# Patient Record
Sex: FEMALE | Race: WHITE | NOT HISPANIC OR LATINO | Employment: FULL TIME | URBAN - METROPOLITAN AREA
[De-identification: names, ages, dates, MRNs, and addresses within clinical notes are randomized per-mention and may not be internally consistent; named-entity substitution may affect disease eponyms.]

---

## 2017-08-14 ENCOUNTER — OFFICE VISIT (OUTPATIENT)
Dept: SURGERY | Facility: CLINIC | Age: 62
End: 2017-08-14
Payer: COMMERCIAL

## 2017-08-14 ENCOUNTER — TELEPHONE (OUTPATIENT)
Dept: ENDOSCOPY | Facility: HOSPITAL | Age: 62
End: 2017-08-14

## 2017-08-14 VITALS
BODY MASS INDEX: 30.25 KG/M2 | DIASTOLIC BLOOD PRESSURE: 77 MMHG | HEIGHT: 66 IN | WEIGHT: 188.25 LBS | SYSTOLIC BLOOD PRESSURE: 129 MMHG | HEART RATE: 81 BPM

## 2017-08-14 DIAGNOSIS — K62.89 RECTAL OR ANAL PAIN: ICD-10-CM

## 2017-08-14 DIAGNOSIS — Z12.11 SPECIAL SCREENING FOR MALIGNANT NEOPLASMS, COLON: Primary | ICD-10-CM

## 2017-08-14 DIAGNOSIS — Z86.010 HISTORY OF COLON POLYPS: Primary | ICD-10-CM

## 2017-08-14 PROCEDURE — 3008F BODY MASS INDEX DOCD: CPT | Mod: S$GLB,,, | Performed by: COLON & RECTAL SURGERY

## 2017-08-14 PROCEDURE — 99204 OFFICE O/P NEW MOD 45 MIN: CPT | Mod: 25,S$GLB,, | Performed by: COLON & RECTAL SURGERY

## 2017-08-14 PROCEDURE — 99999 PR PBB SHADOW E&M-EST. PATIENT-LVL III: CPT | Mod: PBBFAC,,, | Performed by: COLON & RECTAL SURGERY

## 2017-08-14 RX ORDER — METFORMIN HYDROCHLORIDE EXTENDED-RELEASE TABLETS 1000 MG/1
TABLET, FILM COATED, EXTENDED RELEASE ORAL
COMMUNITY
Start: 2017-05-10 | End: 2019-05-28 | Stop reason: SDUPTHER

## 2017-08-14 RX ORDER — INSULIN GLARGINE 100 [IU]/ML
INJECTION, SOLUTION SUBCUTANEOUS
COMMUNITY
Start: 2017-07-06 | End: 2019-05-28 | Stop reason: SDUPTHER

## 2017-08-14 RX ORDER — CANAGLIFLOZIN 100 MG/1
TABLET, FILM COATED ORAL
Status: ON HOLD | COMMUNITY
Start: 2017-05-25 | End: 2017-09-14

## 2017-08-14 RX ORDER — METFORMIN HYDROCHLORIDE 500 MG/1
TABLET, EXTENDED RELEASE ORAL
COMMUNITY
Start: 2017-08-10 | End: 2017-08-14

## 2017-08-14 RX ORDER — OXYBUTYNIN CHLORIDE 15 MG/1
TABLET, EXTENDED RELEASE ORAL
COMMUNITY
Start: 2016-12-16 | End: 2021-02-12 | Stop reason: SDUPTHER

## 2017-08-14 RX ORDER — POLYETHYLENE GLYCOL 3350, SODIUM SULFATE ANHYDROUS, SODIUM BICARBONATE, SODIUM CHLORIDE, POTASSIUM CHLORIDE 236; 22.74; 6.74; 5.86; 2.97 G/4L; G/4L; G/4L; G/4L; G/4L
4 POWDER, FOR SOLUTION ORAL ONCE
Qty: 4000 ML | Refills: 0 | Status: SHIPPED | OUTPATIENT
Start: 2017-08-14 | End: 2017-08-14

## 2017-08-14 RX ORDER — ROSUVASTATIN CALCIUM 5 MG/1
5 TABLET, COATED ORAL DAILY
COMMUNITY
End: 2021-02-12 | Stop reason: ALTCHOICE

## 2017-08-14 NOTE — PROGRESS NOTES
Subjective:       Patient ID: Dulce Maria Luna is a 61 y.o. female.    Chief Complaint: Anal Fissure and Rectal Pain    HPI New pt.  With Sjogren's syndrome.  Complains of constipation manifests by difficulty with evacuation requiring positional changes and some manual manipulation.  She has a bowel movement approximately every 4 days, takes Colace, pain is only 1 out of 10. No bleeding.  She had an InterStim placed last year for urinary incontinence.  This has not helped.  She's had 2 episodes of incontinence to liquid stool.  Last colonoscopy 2011.    Review of Systems   Constitutional: Negative for chills and fever.   HENT:        Dry mouth   Eyes:        Dry eyes   Respiratory: Negative for cough and shortness of breath.    Gastrointestinal: Negative for nausea and vomiting.   Genitourinary: Positive for enuresis and urgency.   Neurological: Negative for seizures and numbness.       Objective:      Physical Exam   Constitutional: She is oriented to person, place, and time. She appears well-developed and well-nourished.   Eyes: Conjunctivae and EOM are normal.   Pulmonary/Chest: Effort normal. No respiratory distress.   Genitourinary:   Genitourinary Comments: Anal rectal: Normal perianal skin  MARCUS: Decreased tone with decreased squeeze.  No masses. Relaxation with bear down.  No rectocele. Anoscopy: Normal distal rectal mucosa, internal hemorrhoids without stigmata of prolapse or bleeding.   Musculoskeletal: Normal range of motion. She exhibits no edema.   Neurological: She is alert and oriented to person, place, and time.   Skin: Skin is warm. No erythema.   Psychiatric: She has a normal mood and affect. Her behavior is normal.       Assessment:       1. History of colon polyps    2. Rectal or anal pain        Plan:       Dietary and supplemental fiber.  Colonoscopy.  Consider defecography if no improvement with fiber.

## 2017-09-01 ENCOUNTER — TELEPHONE (OUTPATIENT)
Dept: ENDOSCOPY | Facility: HOSPITAL | Age: 62
End: 2017-09-01

## 2017-09-01 RX ORDER — LISINOPRIL 5 MG/1
5 TABLET ORAL DAILY
COMMUNITY
End: 2022-02-02 | Stop reason: DRUGHIGH

## 2017-09-01 NOTE — TELEPHONE ENCOUNTER
----- Message from Argentina Stapleton RN sent at 9/1/2017 10:26 AM CDT -----  Contact: Pt  Pt wants to reschedule scope.   Thank you,  Argentina     ----- Message -----  From: Komal Augustin  Sent: 9/1/2017   9:46 AM  To: [facility] Staff    Pt needs to reschedule an appt    Pt can be reached at [phone] or [phone]    Thanks

## 2017-09-14 ENCOUNTER — ANESTHESIA EVENT (OUTPATIENT)
Dept: ENDOSCOPY | Facility: HOSPITAL | Age: 62
End: 2017-09-14
Payer: COMMERCIAL

## 2017-09-14 ENCOUNTER — ANESTHESIA (OUTPATIENT)
Dept: ENDOSCOPY | Facility: HOSPITAL | Age: 62
End: 2017-09-14
Payer: COMMERCIAL

## 2017-09-14 ENCOUNTER — SURGERY (OUTPATIENT)
Age: 62
End: 2017-09-14

## 2017-09-14 ENCOUNTER — HOSPITAL ENCOUNTER (OUTPATIENT)
Facility: HOSPITAL | Age: 62
Discharge: HOME | End: 2017-09-14
Attending: COLON & RECTAL SURGERY | Admitting: COLON & RECTAL SURGERY
Payer: COMMERCIAL

## 2017-09-14 VITALS
SYSTOLIC BLOOD PRESSURE: 124 MMHG | BODY MASS INDEX: 29.73 KG/M2 | DIASTOLIC BLOOD PRESSURE: 64 MMHG | HEIGHT: 66 IN | RESPIRATION RATE: 18 BRPM | HEART RATE: 69 BPM | WEIGHT: 185 LBS | OXYGEN SATURATION: 99 % | TEMPERATURE: 98 F

## 2017-09-14 VITALS — RESPIRATION RATE: 12 BRPM

## 2017-09-14 DIAGNOSIS — Z12.11 SCREENING FOR COLON CANCER: ICD-10-CM

## 2017-09-14 LAB
POCT GLUCOSE: 204 MG/DL (ref 70–110)
POCT GLUCOSE: <20 MG/DL (ref 70–110)

## 2017-09-14 PROCEDURE — G0105 COLORECTAL SCRN; HI RISK IND: HCPCS | Mod: ,,, | Performed by: COLON & RECTAL SURGERY

## 2017-09-14 PROCEDURE — D9220A PRA ANESTHESIA: Mod: 33,ANES,, | Performed by: ANESTHESIOLOGY

## 2017-09-14 PROCEDURE — 25000003 PHARM REV CODE 250: Performed by: NURSE PRACTITIONER

## 2017-09-14 PROCEDURE — G0105 COLORECTAL SCRN; HI RISK IND: HCPCS | Performed by: COLON & RECTAL SURGERY

## 2017-09-14 PROCEDURE — 37000009 HC ANESTHESIA EA ADD 15 MINS: Performed by: COLON & RECTAL SURGERY

## 2017-09-14 PROCEDURE — D9220A PRA ANESTHESIA: Mod: 33,CRNA,, | Performed by: NURSE ANESTHETIST, CERTIFIED REGISTERED

## 2017-09-14 PROCEDURE — 63600175 PHARM REV CODE 636 W HCPCS: Performed by: NURSE ANESTHETIST, CERTIFIED REGISTERED

## 2017-09-14 PROCEDURE — 37000008 HC ANESTHESIA 1ST 15 MINUTES: Performed by: COLON & RECTAL SURGERY

## 2017-09-14 RX ORDER — PROPOFOL 10 MG/ML
VIAL (ML) INTRAVENOUS
Status: DISCONTINUED | OUTPATIENT
Start: 2017-09-14 | End: 2017-09-14

## 2017-09-14 RX ORDER — PROPOFOL 10 MG/ML
VIAL (ML) INTRAVENOUS CONTINUOUS PRN
Status: DISCONTINUED | OUTPATIENT
Start: 2017-09-14 | End: 2017-09-14

## 2017-09-14 RX ORDER — LIDOCAINE HCL/PF 100 MG/5ML
SYRINGE (ML) INTRAVENOUS
Status: DISCONTINUED | OUTPATIENT
Start: 2017-09-14 | End: 2017-09-14

## 2017-09-14 RX ORDER — SODIUM CHLORIDE 9 MG/ML
INJECTION, SOLUTION INTRAVENOUS CONTINUOUS
Status: DISCONTINUED | OUTPATIENT
Start: 2017-09-14 | End: 2017-09-14 | Stop reason: HOSPADM

## 2017-09-14 RX ADMIN — PROPOFOL 80 MG: 10 INJECTION, EMULSION INTRAVENOUS at 10:09

## 2017-09-14 RX ADMIN — SODIUM CHLORIDE: 900 INJECTION, SOLUTION INTRAVENOUS at 10:09

## 2017-09-14 RX ADMIN — LIDOCAINE HYDROCHLORIDE 75 MG: 20 INJECTION, SOLUTION INTRAVENOUS at 10:09

## 2017-09-14 RX ADMIN — PROPOFOL 150 MCG/KG/MIN: 10 INJECTION, EMULSION INTRAVENOUS at 10:09

## 2017-09-14 NOTE — ANESTHESIA PREPROCEDURE EVALUATION
09/14/2017  Dulce Maria Luna is a 61 y.o., female here for colonoscopy.    Pre-op Assessment    I have reviewed the Patient Summary Reports.     I have reviewed the Nursing Notes.   I have reviewed the Medications.     Review of Systems  Anesthesia Hx:  No problems with previous Anesthesia  History of prior surgery of interest to airway management or planning: (s/pbladder sling(SPARC) sx/Bladder sling removed/x3 C-sections/colonoscopy-polyps removed/T&A/Lap Band sx/Partial Hystrectomy/Alfredo.Oophrectomy/Urinary sx for injury repair/botox injection/cyto X3) Denies Family Hx of Anesthesia complications.   Denies Personal Hx of Anesthesia complications.   Social:  Tobacco Use: Former smoker for 20 years ( quit 14years ago) years, quit smoking >10 years ago Alcohol Use: Pt consumes occ'l., Denies Alcohol Abuse. alcohol use is current   Hematology/Oncology:         -- Denies Anemia: -- Denies Leukemia:   -- Denies Lymphoma:   -- Coag Disorders: Denies Bleeding Disorder:  Denies Current/Recent Cancer  --  Denies Cancer in past history:    EENT/Dental:   Eyes: Visual Impairment Wears Glasses. Wears reading glasses only.  Denies ear symptoms  Denies Nasal Symptoms.  Denies Jaw Problems   Cardiovascular:  Functional Capacity 4 METS, housework/climbs stairs daily  Denies Deep Venous Thrombosis (DVT)   Denies Peripheral Arterial Disease.   Denies Hypertension.    Pulmonary:   Denies Pneumonia Asthma Shortness of breath  Asthma:  last episode was > 1 year ago.  Denies Obstructive Sleep Apnea (PAM) Possible Obstructive Sleep Apnea , (STOP/BANG) Symptoms S - Snoring (loud)  Pulmonary Infection:  Pneumonia (history). , past history (> 3 months ago).   Renal/:  Renal Symptoms/Infections/Stones: nocturia, incontinence, urgency.  Urinary Tract Infection (UTI) (treated 1 month ago) Hypertonicity of bladder   Hepatic/GI:   GERD   Esophageal / Stomach Disorders Gerd Controlled by chronic antireflux medication.  Denies Liver Disease    Musculoskeletal:  Musculoskeletal General/Symptoms: neck pain. Functional capacity is unlimited.  Denies Joint Disease   Rheumatic Disease, Sjogren Syndrome    Neurological:   no Neuro Symptoms Denies Seizure Disorder  Denies CVA - Cerebrovasular Accident  Denies TIA - Transient Ischemic Attack    Endocrine:   Diabetes, type 2  Diabetes, Type 2 Diabetes for 3 years years , controlled by oral hypoglycemics, insulin. Typical AM glucose range: 120s-130s  Denies Thyroid Disease  Metabolic Disorders, Hyperlipoproteinemia, mixed hyperlipidemia  Psych:   Denies Sleep Disorder. Anxiety Disorder and Chronic Anxiety Disorder.   Denies Depression.   Denies Sleep Disorder.        Physical Exam  General:  Well nourished    Airway/Jaw/Neck:  Airway Findings: Mouth Opening: Small, but > 3cm Tongue: Normal  General Airway Assessment: Adult, Possible difficult intubation  Mallampati: IV  Improves to III with phonation.  TM Distance: Normal, at least 6 cm        Eyes/Ears/Nose:  EYES/EARS/NOSE FINDINGS: Normal   Dental:  DENTAL FINDINGS: Normal   Chest/Lungs:  Chest/Lungs Findings: Clear to auscultation, Normal Respiratory Rate     Heart/Vascular:  Heart Findings: Normal Heart murmur: negative       Mental Status:  Mental Status Findings: Normal        Anesthesia Plan  Type of Anesthesia, risks & benefits discussed:  Anesthesia Type:  general  Patient's Preference: MAC  Intra-op Monitoring Plan: standard ASA monitors  Intra-op Monitoring Plan Comments:   Post Op Pain Control Plan: multimodal analgesia  Post Op Pain Control Plan Comments:   Induction:   IV  Beta Blocker:  Patient is not currently on a Beta-Blocker (No further documentation required).       Informed Consent: Patient understands risks and agrees with Anesthesia plan.  Questions answered. Anesthesia consent signed with patient.  ASA Score: 2     Day of Surgery  Review of History & Physical:    H&P update referred to the surgeon.         Ready For Surgery From Anesthesia Perspective.

## 2017-09-14 NOTE — H&P
Endoscopy H&P    Procedure : Colonoscopy      constipation and personal history of colon polyps      Past Medical History:   Diagnosis Date    Anxiety     Asthma     Bronchitis     Diabetes mellitus     type 2    Disturbed sleep rhythm     due to nocturia    GERD (gastroesophageal reflux disease)     High cholesterol     Hx of colonic polyps     Neck pain     injury hx-bulging x3 disk    Nocturia     Pneumonia     history    Sjogren's disease     Snoring     SOB (shortness of breath) on exertion     mild due to deconditioning    Visual impairment              Review of Systems -ROS:  GENERAL: No fever, chills, fatigability or weight loss.  CHEST: Denies AIKEN, cyanosis, wheezing, cough and sputum production.  CARDIOVASCULAR: Denies chest pain, PND, orthopnea or reduced exercise tolerance.   Musculoskeletal ROS: negative for - gait disturbance or joint pain  Neurological ROS: negative for - confusion or memory loss        Physical Exam:  General: well developed, well nourished, no distress  Head: normocephalic  Neck: supple, symmetrical, trachea midline  Lungs: normal respiratory effort  Heart: regular rate and rhythm  Abdomen: soft, non-tender non-distented; bowel sounds normal; no masses,  no organomegaly  Extremities: no cyanosis or edema, or clubbing       Moderate Sedation (choice): Mallampati Score 2    ASA : II    IMP: constipation and personal history of colon polyps    Plan: Colonoscopy with Moderate sedation.  I have explained the procedure including indications, alternatives, expected outcomes and potential complications. The patient appears to understand and gives informed consent. The patient is medically ready for surgery.

## 2017-09-14 NOTE — PATIENT INSTRUCTIONS
Discharge Summary/Instructions after an Endoscopic Procedure  Patient Name: Dulce Maria Luna  Patient MRN: [id number]  Patient YOB: 1955 Thursday, September 14, 2017  Indra Garcia MD  RESTRICTIONS:  During your procedure today, you received medications for sedation.  These   medications may affect your judgment, balance and coordination.  Therefore,   for 24 hours, you have the following restrictions:   - DO NOT drive a car, operate machinery, make legal/financial decisions,   sign important papers or drink alcohol.    ACTIVITY:  The following day: return to full activity including work, except no heavy   lifting, straining or running for 3 days if polyps were removed.  DIET:  Eat and drink normally unless instructed otherwise.  TREATMENT FOR COMMON SIDE EFFECTS:  - Mild abdominal pain, belching, bloating or excessive gas: rest, eat   lightly and use a heating pad.  - Sore Throat: treat with throat lozenges and/or gargle with warm salt   water.  SYMPTOMS TO WATCH FOR AND REPORT TO YOUR PHYSICIAN:  1. Abdominal pain or bloating, other than gas cramps.  2. Chest pain.  3. Back pain.  4. Chills or fever occurring within 24 hours after the procedure.  5. Rectal bleeding, which would show as bright red, maroon, or black stools.   (A tablespoon of blood from the rectum is not serious, especially if   hemorrhoids are present.)  6. Vomiting.  7. Weakness or dizziness.  8. Because air was used during the procedure, expelling large amounts of air   from your rectum or belching is normal.  9. If a bowel prep was taken, you may not have a bowel movement for 1-3   days.  This is normal.  GO DIRECTLY TO THE EMERGENCY ROOM IF YOU HAVE ANY OF THE FOLLOWING:   Difficulty breathing   Chills and/or fever over 101 F   Persistent vomiting and/or vomiting blood   Severe abdominal pain   Severe chest pain   Black, tarry stools   Bleeding- more than one tablespoon  Your doctor recommends these additional instructions:  If  any biopsies were taken, your doctors clinic will call you in 1 to 2   weeks with any results.  You have a contact number available for emergencies.  The signs and symptoms   of potential delayed complications were discussed with you.  You may return   to normal activities tomorrow.  Written discharge instructions were   provided to you.   You are being discharged to home.   Resume your regular diet indefinitely.   Continue your present medications.   Your physician has recommended a repeat colonoscopy in 10 years for   screening purposes.  For questions, problems or results please call your physician - Indra Garcia MD at Work:  [phone].  [facility], EMERGENCY ROOM PHONE NUMBER: [phone]  IF A COMPLICATION OR EMERGENCY SITUATION ARISES AND YOU ARE UNABLE TO REACH   YOUR PHYSICIAN - GO DIRECTLY TO THE EMERGENCY ROOM.  Indra Garcia MD  9/14/2017 10:47:05 AM  This report has been verified and signed electronically.

## 2017-09-14 NOTE — DISCHARGE INSTRUCTIONS
Colonoscopy     A camera attached to a flexible tube with a viewing lens is used to take video pictures.     Colonoscopy is a test to view the inside of your lower digestive tract (colon and rectum). Sometimes it can show the last part of the small intestine (ileum). During the test, small pieces of tissue may be removed for testing. This is called a biopsy. Small growths, such as polyps, may also be removed.   Why is colonoscopy done?  The test is done to help look for colon cancer. And it can help find the source of abdominal pain, bleeding, and changes in bowel habits. It may be needed once a year, depending on factors such as your:  · Age  · Health history  · Family health history  · Symptoms  · Results from any prior colonoscopy  Risks and possible complications  These include:  · Bleeding               · A puncture or tear in the colon   · Risks of anesthesia  · A cancer lesion not being seen  Getting ready   To prepare for the test:  · Talk with your healthcare provider about the risks of the test (see below). Also ask your healthcare provider about alternatives to the test.  · Tell your healthcare provider about any medicines you take. Also tell him or her about any health conditions you may have.  · Make sure your rectum and colon are empty for the test. Follow the diet and bowel prep instructions exactly. If you dont, the test may need to be rescheduled.  · Plan for a friend or family member to drive you home after the test.     Colonoscopy provides an inside view of the entire colon.     You may discuss the results with your doctor right away or at a future visit.  During the test   The test is usually done in the hospital on an outpatient basis. This means you go home the same day. The procedure takes about 30 minutes. During that time:  · You are given relaxing (sedating) medicine through an IV line. You may be drowsy, or fully asleep.  · The healthcare provider will first give you a physical exam to  check for anal and rectal problems.  · Then the anus is lubricated and the scope inserted.  · If you are awake, you may have a feeling similar to needing to have a bowel movement. You may also feel pressure as air is pumped into the colon. Its OK to pass gas during the procedure.  · Biopsy, polyp removal, or other treatments may be done during the test.  After the test   You may have gas right after the test. It can help to try to pass it to help prevent later bloating. Your healthcare provider may discuss the results with you right away. Or you may need to schedule a follow-up visit to talk about the results. After the test, you can go back to your normal eating and other activities. You may be tired from the sedation and need to rest for a few hours.  Date Last Reviewed: 11/1/2016 © 2000-2017 [employer]. [address] [place] [address], [place] [address]. All rights reserved. This information is not intended as a substitute for professional medical care. Always follow your healthcare professional's instructions.

## 2017-09-14 NOTE — TRANSFER OF CARE
"Anesthesia Transfer of Care Note    Patient: Dulce Maria Luna    Procedure(s) Performed: Procedure(s) (LRB):  COLONOSCOPY (N/A)    Patient location: GI    Anesthesia Type: general    Transport from OR: Transported from OR on room air with adequate spontaneous ventilation    Post pain: adequate analgesia    Post assessment: no apparent anesthetic complications    Post vital signs: stable    Level of consciousness: awake    Nausea/Vomiting: no nausea/vomiting    Complications: none    Transfer of care protocol was followed      Last vitals:   Visit Vitals  /74   Pulse 70   Temp 36.6 °C (97.9 °F) (Oral)   Resp 16   Ht 5' 6" (1.676 m)   Wt 83.9 kg (185 lb)   SpO2 96%   Breastfeeding? No   BMI 29.86 kg/m²     "

## 2017-09-15 NOTE — ANESTHESIA POSTPROCEDURE EVALUATION
"Anesthesia Post Evaluation    Patient: Dulce Maria Luna    Procedure(s) Performed: Procedure(s) (LRB):  COLONOSCOPY (N/A)    Final Anesthesia Type: general  Patient location during evaluation: GI PACU  Patient participation: Yes- Able to Participate  Level of consciousness: awake and alert  Post-procedure vital signs: reviewed and stable  Pain management: adequate  Airway patency: patent  PONV status at discharge: No PONV  Anesthetic complications: no      Cardiovascular status: blood pressure returned to baseline  Respiratory status: spontaneous ventilation  Hydration status: euvolemic  Follow-up not needed.        Visit Vitals  /64   Pulse 69   Temp 36.6 °C (97.9 °F) (Oral)   Resp 18   Ht 5' 6" (1.676 m)   Wt 83.9 kg (185 lb)   SpO2 99%   Breastfeeding? No   BMI 29.86 kg/m²       Pain/Abhijeet Score: Pain Assessment Performed: Yes (9/14/2017 10:54 AM)  Presence of Pain: complains of pain/discomfort (9/14/2017 10:54 AM)  Pain Rating Prior to Med Admin: 3 (9/14/2017 10:54 AM)  Abhijeet Score: 10 (9/14/2017 10:54 AM)      "

## 2017-09-21 ENCOUNTER — TELEPHONE (OUTPATIENT)
Dept: ENDOSCOPY | Facility: HOSPITAL | Age: 62
End: 2017-09-21

## 2019-05-28 ENCOUNTER — OFFICE VISIT (OUTPATIENT)
Dept: SURGERY | Facility: CLINIC | Age: 64
End: 2019-05-28
Payer: MEDICARE

## 2019-05-28 VITALS
SYSTOLIC BLOOD PRESSURE: 137 MMHG | HEIGHT: 66 IN | BODY MASS INDEX: 29.87 KG/M2 | WEIGHT: 185.88 LBS | DIASTOLIC BLOOD PRESSURE: 92 MMHG | HEART RATE: 113 BPM

## 2019-05-28 DIAGNOSIS — K59.02 CONSTIPATION BY OUTLET DYSFUNCTION: Primary | ICD-10-CM

## 2019-05-28 PROCEDURE — 46600 DIAGNOSTIC ANOSCOPY SPX: CPT | Mod: PBBFAC | Performed by: NURSE PRACTITIONER

## 2019-05-28 PROCEDURE — 99999 PR PBB SHADOW E&M-EST. PATIENT-LVL III: ICD-10-PCS | Mod: PBBFAC,,, | Performed by: NURSE PRACTITIONER

## 2019-05-28 PROCEDURE — 46600 DIAGNOSTIC ANOSCOPY SPX: CPT | Mod: S$PBB,,, | Performed by: NURSE PRACTITIONER

## 2019-05-28 PROCEDURE — 99999 PR PBB SHADOW E&M-EST. PATIENT-LVL III: CPT | Mod: PBBFAC,,, | Performed by: NURSE PRACTITIONER

## 2019-05-28 PROCEDURE — 46600 PR DIAG2STIC A2SCOPY: ICD-10-PCS | Mod: S$PBB,,, | Performed by: NURSE PRACTITIONER

## 2019-05-28 PROCEDURE — 99214 OFFICE O/P EST MOD 30 MIN: CPT | Mod: S$PBB,25,, | Performed by: NURSE PRACTITIONER

## 2019-05-28 PROCEDURE — 99214 PR OFFICE/OUTPT VISIT, EST, LEVL IV, 30-39 MIN: ICD-10-PCS | Mod: S$PBB,25,, | Performed by: NURSE PRACTITIONER

## 2019-05-28 PROCEDURE — 99213 OFFICE O/P EST LOW 20 MIN: CPT | Mod: PBBFAC | Performed by: NURSE PRACTITIONER

## 2019-05-28 RX ORDER — OXYBUTYNIN CHLORIDE 5 MG/1
TABLET ORAL
Refills: 5 | COMMUNITY
Start: 2019-03-26 | End: 2021-02-12 | Stop reason: SDUPTHER

## 2019-05-28 RX ORDER — CYCLOSPORINE 0.5 MG/ML
1 EMULSION OPHTHALMIC 2 TIMES DAILY
Refills: 5 | COMMUNITY
Start: 2019-03-26

## 2019-05-28 RX ORDER — LINACLOTIDE 145 UG/1
145 CAPSULE, GELATIN COATED ORAL NIGHTLY
Refills: 5 | COMMUNITY
Start: 2019-05-24

## 2019-05-28 RX ORDER — MONTELUKAST SODIUM 10 MG/1
10 TABLET ORAL NIGHTLY
Refills: 5 | COMMUNITY
Start: 2019-05-03

## 2019-05-28 RX ORDER — METFORMIN HYDROCHLORIDE 500 MG/1
1000 TABLET, EXTENDED RELEASE ORAL 2 TIMES DAILY WITH MEALS
Refills: 5 | COMMUNITY
Start: 2019-03-26 | End: 2023-08-22 | Stop reason: SDUPTHER

## 2019-05-28 NOTE — PROGRESS NOTES
Subjective:       Patient ID: Dulce Maria Luna is a 63 y.o. female.    Chief Complaint: No chief complaint on file.    HPI   63 F who presents to clinic for years of constipation and difficult BMs X 2 years. Complains of constipation manifests by difficulty with evacuation requiring positional changes and some manual manipulation.  She has a bowel movement approximately once a week. She takes Linzess, daily metamucil, stool softeners, magnesium citrate and miralax. Stools are now very thin and loose. Denies rectal pain during BM, more pressure. Occasional abdominal pain, +nauseau.     She is also a patient of Dr Mcclure for urinary incontience. She had an InterStim placed years ago for this which has not helped.        Colonoscopy 2017, Jose  The entire examined colon is normal.                        - No specimens collected.  Review of Systems   Constitutional: Negative for fatigue, fever and unexpected weight change.   Respiratory: Negative for shortness of breath.    Cardiovascular: Negative for chest pain.   Gastrointestinal: Positive for abdominal pain, constipation and nausea. Negative for abdominal distention, anal bleeding, blood in stool, diarrhea, rectal pain and vomiting.   Genitourinary:        Urinary incontinence        Objective:      Physical Exam   Constitutional: She is oriented to person, place, and time. She appears well-developed and well-nourished. No distress.   Eyes: Conjunctivae and EOM are normal.   Pulmonary/Chest: Effort normal. No respiratory distress.   Abdominal: Soft. She exhibits no distension. There is no tenderness.   Genitourinary:   Genitourinary Comments: Normal perianal skin. eversion of anus revealed no abnormality or fissure, MARCUS revealed no masses, blood or stool in vault, normal sphincter tone, anoscopy revealed normal internal hemorrhoids with no bleeding or stigmata of same.  Good tone, good squeeze and relaxation with valsalva    Musculoskeletal: Normal range of  motion.   Neurological: She is alert and oriented to person, place, and time.   Skin: Skin is warm and dry.   Psychiatric: She has a normal mood and affect. Her behavior is normal.       Assessment:       1. Constipation by outlet dysfunction        Plan:       Defocography (can't undergo MRI due to sacral nerve stim)   Make need ARM, possible sitz marker  WIll call with results

## 2019-06-05 ENCOUNTER — TELEPHONE (OUTPATIENT)
Dept: RADIOLOGY | Facility: HOSPITAL | Age: 64
End: 2019-06-05

## 2019-06-06 ENCOUNTER — HOSPITAL ENCOUNTER (OUTPATIENT)
Dept: RADIOLOGY | Facility: HOSPITAL | Age: 64
Discharge: HOME | End: 2019-06-06
Attending: NURSE PRACTITIONER
Payer: MEDICARE

## 2019-06-06 DIAGNOSIS — K59.02 CONSTIPATION BY OUTLET DYSFUNCTION: ICD-10-CM

## 2019-06-06 PROCEDURE — 74270 X-RAY XM COLON 1CNTRST STD: CPT | Mod: TC

## 2019-06-06 PROCEDURE — 74270 X-RAY XM COLON 1CNTRST STD: CPT | Mod: 26,,, | Performed by: RADIOLOGY

## 2019-06-06 PROCEDURE — 74270 FL DEFECOGRAM: ICD-10-PCS | Mod: 26,,, | Performed by: RADIOLOGY

## 2019-06-27 ENCOUNTER — OFFICE VISIT (OUTPATIENT)
Dept: SURGERY | Facility: CLINIC | Age: 64
End: 2019-06-27
Payer: MEDICARE

## 2019-06-27 VITALS
HEART RATE: 86 BPM | BODY MASS INDEX: 29.55 KG/M2 | SYSTOLIC BLOOD PRESSURE: 135 MMHG | DIASTOLIC BLOOD PRESSURE: 81 MMHG | WEIGHT: 183.88 LBS | HEIGHT: 66 IN

## 2019-06-27 DIAGNOSIS — K59.00 CONSTIPATION, UNSPECIFIED CONSTIPATION TYPE: ICD-10-CM

## 2019-06-27 DIAGNOSIS — K59.02 CONSTIPATION BY OUTLET DYSFUNCTION: Primary | ICD-10-CM

## 2019-06-27 DIAGNOSIS — R32 URINARY INCONTINENCE, UNSPECIFIED TYPE: ICD-10-CM

## 2019-06-27 PROCEDURE — 99999 PR PBB SHADOW E&M-EST. PATIENT-LVL IV: ICD-10-PCS | Mod: PBBFAC,,, | Performed by: COLON & RECTAL SURGERY

## 2019-06-27 PROCEDURE — 99214 PR OFFICE/OUTPT VISIT, EST, LEVL IV, 30-39 MIN: ICD-10-PCS | Mod: S$PBB,,, | Performed by: COLON & RECTAL SURGERY

## 2019-06-27 PROCEDURE — 99214 OFFICE O/P EST MOD 30 MIN: CPT | Mod: S$PBB,,, | Performed by: COLON & RECTAL SURGERY

## 2019-06-27 PROCEDURE — 99214 OFFICE O/P EST MOD 30 MIN: CPT | Mod: PBBFAC | Performed by: COLON & RECTAL SURGERY

## 2019-06-27 PROCEDURE — 99999 PR PBB SHADOW E&M-EST. PATIENT-LVL IV: CPT | Mod: PBBFAC,,, | Performed by: COLON & RECTAL SURGERY

## 2019-06-28 NOTE — PROGRESS NOTES
Subjective:       Patient ID: Dulce Maria Luna is a 63 y.o. female.    Chief Complaint: Obstructed defecation    HPI established patient. Several year history of constipation consistent with outlet type obstruction based on symptoms.  Defogram shows short segment rectal intussusception with otherwise normal pelvic floor relaxation with defecation.  And complete evacuation of contrast.  She has been on MiraLax Linzess and stool softeners in a fairly schedule way but occasionally must take additional doses.  No sitzmarks study. Additionally has urinary incontinence and has a sacral nerve stimulator in place which is not been effective.  Colonoscopy 2017 - extremely difficult exam secondary to significant looping    Review of Systems   Constitutional: Negative for chills and fever.   Respiratory: Negative for cough and shortness of breath.    Cardiovascular: Negative for chest pain and palpitations.   Gastrointestinal: Negative for nausea and vomiting.   Genitourinary: Negative for dysuria and urgency.   Neurological: Negative for seizures and numbness.       Objective:      Physical Exam  no exam performed today.  Assessment:       1. Constipation by outlet dysfunction        Plan:       We had a lengthy discussion about the significance of rectal intussusception as a precursor to prolapse.  I expressed my concern that she has ongoing pelvic floor dysfunction manifest by urinary incontinence and suggested that despite normal relaxation or defogram we proceed with pelvic floor physical therapy and biofeedback, a referral to Dr. myranda buckner for assessment of the need for any additional pelvic floor poor procedures if we decided to proceed with rectopexy.  Additionally I would like to add a sitzmarks study.

## 2019-07-28 NOTE — PROGRESS NOTES
CHIEF COMPLAINT:    Mrs. uLna is a 63 y.o. female presenting as a new patient for mixed incontinence     PRESENTING ILLNESS:    Dulce Maria Luna is a 63 y.o. female who presents for mixed incontinence.  She was last seen in 10/2015.  She states that after her workup, her daughter encouraged her to come to [place] for a second opinion as she had 2 young children, and the patient saw Lorraine Weber at [facility].  She placed an InterStim.  This has not worked well for her.  She continues to be incontinent and takes oxybutynin 10 mg XL bid.  She still has incontinence.      History:      She is status post Sparc sling placed in 11/15/2005 by Dr. Black.  She had significant lower urinary tract symptoms with urgency, urge incontinence after the sling and had to strain to urinate.  She was evaluated with urodynamics on 1/30/2006 and had a maximal cystometric capacity of 258 ml with terminal DO, and was found to have rhythmic bladder contractions during filling.  This was markedly different when compared to her pre op urodynamics per that report.  She had a sling incision on 4/4/2006.  She was had ongoing urinary symptoms and another urodynamics and cystoscopy were done by Dr. Black on 4/21/2010.  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      On 8/17/2011 I took over her care and at that time she was found to have refractory urgency and urge incontinence, being on oxybutynin 10 mg bid and had been tried on Toviaz, Vesicare in addition to the oxybutynin.  She had a vaginal exploration and removal of mesh as the vagina was thinned and there were palpable edges (not through but thinned over the cut ed of the sling) with botox injection of 200 U on 2/2/2012.  She had a remote history of ureteral injury at the time of hysterectomy and had to have a stent for a prolonged period of time.  She also has a history of Sjogren's syndrome.  She underwent 5 other injections of  Botox 200 U. When she developed stress incontinence and urge incontinence in , she later had an injection of Macroplastique in addition to the Botox.  I then recommended an autologous sling as she had ongoing stress incontinence, that is when she sought a second opinion as above.       REVIEW OF SYSTEMS:    Review of Systems   Constitutional: Negative.    HENT: Negative.    Eyes: Negative.    Respiratory: Negative.    Cardiovascular: Negative.    Gastrointestinal: Negative.    Genitourinary: Positive for urgency.        Mixed incontinence   Musculoskeletal: Positive for back pain and neck pain.   Skin: Negative.    Neurological: Negative.    Endo/Heme/Allergies: Negative.    Psychiatric/Behavioral: Negative.        PATIENT HISTORY:    Past Medical History:   Diagnosis Date    Anxiety     Asthma     Bronchitis     Diabetes mellitus     type 2    Disturbed sleep rhythm     due to nocturia    GERD (gastroesophageal reflux disease)     High cholesterol     Hx of colonic polyps     Neck pain     injury hx-bulging x3 disk    Nocturia     Pneumonia     history    Sjogren's disease     Snoring     SOB (shortness of breath) on exertion     mild due to deconditioning    Visual impairment        Past Surgical History:   Procedure Laterality Date    BILATERAL OOPHORECTOMY      bladder pacemaker      bladder sling removed      bladder sling sx/SPARC      CERVICAL FUSION       SECTION      COLONOSCOPY N/A 2017    Performed by Indra Garcia [facility] at [facility] ENDO (4TH FLR)    cysto, botox injection x3      CYSTOSCOPY N/A 2014    Performed by Charito [facility]clure [facility] at [facility] OR 1ST FLR    CYSTOSCOPY N/A 2013    Performed by [facility] at [facility] OR 1ST FLR    HYSTERECTOMY      partial    INCONTINENCE SURGERY      INJECTION - Macroplastique N/A 2014    Performed by [facility] at [facility] OR 1ST FLR    INJECTION, BOTULINUM TOXIN, TYPE A N/A 2014    Performed  "by [facility] at [facility] F[facility]    INJECTION, BOTULINUM TOXIN, TYPE A N/A 2013    Performed by [facility] at [facility] 1[facility] FLR    LAPAROSCOPIC GASTRIC BANDING      OOPHORECTOMY      poly removed from bladder      SPARC      TONSILLECTOMY, ADENOIDECTOMY      ureteral injury and repair      x3 C-sections         Family History   Problem Relation Age of Onset    Anesthesia problems Neg Hx      Socioeconomic History    Marital status:    Tobacco Use    Smoking status: Former Smoker     Packs/day: 1.00     Last attempt to quit: 2000     Years since quittin.4    Smokeless tobacco: Never Used   Substance and Sexual Activity    Alcohol use: Yes     Comment: occ'l.    Drug use: No    Sexual activity: Yes     Partners: Male       Allergies:  Aleve [naproxen sodium]; Latex, natural rubber; Sanctura [trospium]; and Sulfa (sulfonamide antibiotics)    Medications:  Outpatient Encounter Medications as of 2019   Medication Sig Dispense Refill    BD INSULIN PEN NEEDLE UF MINI 31 x 3/16 " Ndle       BREEZE 2 TEST STRIPS Strp       cetirizine (ZYRTEC) 10 MG tablet Take 10 mg by mouth once daily.        empagliflozin (JARDIANCE) 10 mg Tab Take 10 mg by mouth once.      insulin glargine (LANTUS) 100 unit/mL injection Inject 25 Units into the skin every evening.      levocetirizine (XYZAL) 5 MG tablet   99    LINZESS 145 mcg Cap capsule   5    lisinopril (PRINIVIL,ZESTRIL) 5 MG tablet Take 5 mg by mouth once daily.      lorazepam (ATIVAN) 1 MG tablet Take 1 mg by mouth every 6 (six) hours as needed.        metFORMIN (GLUCOPHAGE-XR) 500 MG 24 hr tablet   5    mirabegron 50 mg Tb24 Take 50 mg by mouth.      montelukast (SINGULAIR) 10 mg tablet   5    oxybutynin (DITROPAN XL) 15 MG TR24 TAKE ONE TABLET TWICE A  DAY -(IN THE MORNING & INTHE LATE EVENING)- EVERY TWELVE HOURS (GENERIC    DITROPAN)- --CALL 3 DAYS      oxybutynin (DITROPAN) 5 MG Tab   5    pantoprazole " (PROTONIX) 40 MG tablet   5    polyethylene glycol 3350 (MIRALAX ORAL) Take by mouth.      RESTASIS 0.05 % ophthalmic emulsion   5    rosuvastatin (CRESTOR) 5 MG tablet Take 5 mg by mouth once daily.       No facility-administered encounter medications on file as of 7/31/2019.          PHYSICAL EXAMINATION:    The patient generally appears in good health, is appropriately interactive, and is in no apparent distress.    Skin: No lesions.    Mental: Cooperative with normal affect.    Neuro: Grossly intact.    HEENT: Normal. No evidence of lymphadenopathy.    Chest:  normal inspiratory effort.    Abdomen:  Soft, non-tender. No masses or organomegaly. Bladder is not palpable. No evidence of flank discomfort. No evidence of inguinal hernia.    Extremities: No clubbing, cyanosis, or edema    Normal external female genitalia  Grade II urogenital atrophy  Urethral meatus is normal  Urethra and bladder are nontender to bimanual exam  Posteriorly, has a rectocele  Uterus and cervix are surgically absent  No adnexal masses  PVR by catheterization was 120 ml    LABS:    Lab Results   Component Value Date    BUN 13 09/14/2012    CREATININE 0.7 09/14/2012     UA 1.005, pH 5, tr protein, 1000 glucose, otherwise, negative.     IMPRESSION:    Encounter Diagnoses   Name Primary?    Incomplete bladder emptying Yes       PLAN:    1.  She did not have the patient iCon to interrogate   2.  Because of the refractory nature of her symptoms, could consider bladder neck closure and augmentation cystoplasty with a continent catheterizable stoma (only be she has been through so much, she has such a complex case.) however, she would have to cath.  She tried catheterizing through the urethra and found it difficult.   3.  She is being considered for rectopexy with Dr. Garcia.  She has a rectocele which could be repaired at the same time.

## 2019-07-31 ENCOUNTER — OFFICE VISIT (OUTPATIENT)
Dept: UROLOGY | Facility: CLINIC | Age: 64
End: 2019-07-31
Payer: MEDICARE

## 2019-07-31 VITALS
HEIGHT: 66 IN | HEART RATE: 71 BPM | DIASTOLIC BLOOD PRESSURE: 83 MMHG | SYSTOLIC BLOOD PRESSURE: 125 MMHG | WEIGHT: 188.69 LBS | BODY MASS INDEX: 30.33 KG/M2

## 2019-07-31 DIAGNOSIS — R39.15 URINARY URGENCY: ICD-10-CM

## 2019-07-31 DIAGNOSIS — N39.46 MIXED STRESS AND URGE URINARY INCONTINENCE: ICD-10-CM

## 2019-07-31 DIAGNOSIS — R35.0 URINARY FREQUENCY: ICD-10-CM

## 2019-07-31 DIAGNOSIS — R33.9 INCOMPLETE BLADDER EMPTYING: Primary | ICD-10-CM

## 2019-07-31 PROCEDURE — 87086 URINE CULTURE/COLONY COUNT: CPT

## 2019-07-31 PROCEDURE — 99999 PR PBB SHADOW E&M-EST. PATIENT-LVL IV: CPT | Mod: PBBFAC,,, | Performed by: UROLOGY

## 2019-07-31 PROCEDURE — 51701 PR INSERTION OF NON-INDWELLING BLADDER CATHETERIZATION FOR RESIDUAL UR: ICD-10-PCS | Mod: S$PBB,,, | Performed by: UROLOGY

## 2019-07-31 PROCEDURE — 81002 URINALYSIS NONAUTO W/O SCOPE: CPT | Mod: PBBFAC | Performed by: UROLOGY

## 2019-07-31 PROCEDURE — 99205 PR OFFICE/OUTPT VISIT, NEW, LEVL V, 60-74 MIN: ICD-10-PCS | Mod: S$PBB,25,, | Performed by: UROLOGY

## 2019-07-31 PROCEDURE — 51701 INSERT BLADDER CATHETER: CPT | Mod: S$PBB,,, | Performed by: UROLOGY

## 2019-07-31 PROCEDURE — 99214 OFFICE O/P EST MOD 30 MIN: CPT | Mod: PBBFAC | Performed by: UROLOGY

## 2019-07-31 PROCEDURE — 99205 OFFICE O/P NEW HI 60 MIN: CPT | Mod: S$PBB,25,, | Performed by: UROLOGY

## 2019-07-31 PROCEDURE — 99999 PR PBB SHADOW E&M-EST. PATIENT-LVL IV: ICD-10-PCS | Mod: PBBFAC,,, | Performed by: UROLOGY

## 2019-07-31 PROCEDURE — 51701 INSERT BLADDER CATHETER: CPT | Mod: PBBFAC | Performed by: UROLOGY

## 2019-07-31 RX ORDER — PANTOPRAZOLE SODIUM 40 MG/1
40 TABLET, DELAYED RELEASE ORAL DAILY
Refills: 5 | COMMUNITY
Start: 2019-07-06

## 2019-07-31 RX ORDER — LEVOCETIRIZINE DIHYDROCHLORIDE 5 MG/1
5 TABLET, FILM COATED ORAL DAILY
Refills: 99 | COMMUNITY
Start: 2019-07-06

## 2019-07-31 NOTE — LETTER
August 2, 2019      Indra Garcia MD  [address]  [place] [address]           [facility] - Urology 4th Floor  [address]  [place] [facility] [address]  Phone: [phone]          Patient: Dulce Maria Luna   MR Number: [id number]   YOB: 1955   Date of Visit: 7/31/2019       Dear Dr. Indra Garcia:    Thank you for referring Dulce Maria Luna to me for evaluation. Attached you will find relevant portions of my assessment and plan of care.    If you have questions, please do not hesitate to call me. I look forward to following Dulce Maria Luna along with you.    Sincerely,    Charito Mcclure MD    Enclosure  CC:  No Recipients    If you would like to receive this communication electronically, please contact [email or web address] or [phone] to request more information on [employer] access.    For providers and/or their staff who would like to refer a patient to [facility], please contact us through our one-stop-shop provider referral line, [facility], at [phone].    If you feel you have received this communication in error or would no longer like to receive these types of communications, please e-mail [email or web address]

## 2019-08-01 LAB — BACTERIA UR CULT: NO GROWTH

## 2019-09-04 ENCOUNTER — TELEPHONE (OUTPATIENT)
Dept: UROLOGY | Facility: CLINIC | Age: 64
End: 2019-09-04

## 2019-09-04 NOTE — TELEPHONE ENCOUNTER
----- Message from Darlyn Mota LPN sent at 9/4/2019  2:34 PM CDT -----      ----- Message -----  From: Ashutosh Molina  Sent: 9/4/2019   1:52 PM  To: [facility] Mcneill Staff    Pt calling to schedule a procedure.        [phone]

## 2019-09-04 NOTE — TELEPHONE ENCOUNTER
Pt called to get some available dates for surgery. Pt states she will call back to discuss setting a date once she speaks with her family about when will be the best time.

## 2020-07-08 ENCOUNTER — LAB VISIT (OUTPATIENT)
Dept: PRIMARY CARE CLINIC | Facility: OTHER | Age: 65
End: 2020-07-08
Attending: INTERNAL MEDICINE
Payer: MEDICARE

## 2020-07-08 DIAGNOSIS — Z03.818 ENCOUNTER FOR OBSERVATION FOR SUSPECTED EXPOSURE TO OTHER BIOLOGICAL AGENTS RULED OUT: ICD-10-CM

## 2020-07-08 PROCEDURE — U0003 INFECTIOUS AGENT DETECTION BY NUCLEIC ACID (DNA OR RNA); SEVERE ACUTE RESPIRATORY SYNDROME CORONAVIRUS 2 (SARS-COV-2) (CORONAVIRUS DISEASE [COVID-19]), AMPLIFIED PROBE TECHNIQUE, MAKING USE OF HIGH THROUGHPUT TECHNOLOGIES AS DESCRIBED BY CMS-2020-01-R: HCPCS

## 2020-07-11 LAB — SARS-COV-2 RNA RESP QL NAA+PROBE: NEGATIVE

## 2020-12-18 DIAGNOSIS — U07.1 COVID-19 VIRUS DETECTED: ICD-10-CM

## 2020-12-19 PROBLEM — I10 HTN (HYPERTENSION): Status: ACTIVE | Noted: 2020-12-19

## 2020-12-19 PROBLEM — E11.9 DM2 (DIABETES MELLITUS, TYPE 2): Status: ACTIVE | Noted: 2020-12-19

## 2020-12-19 PROBLEM — J12.82 PNEUMONIA DUE TO COVID-19 VIRUS: Status: ACTIVE | Noted: 2020-12-19

## 2020-12-19 PROBLEM — U07.1 PNEUMONIA DUE TO COVID-19 VIRUS: Status: ACTIVE | Noted: 2020-12-19

## 2020-12-19 PROBLEM — R06.03 ACUTE RESPIRATORY DISTRESS: Status: ACTIVE | Noted: 2020-12-19

## 2020-12-20 PROBLEM — E11.9 DM2 (DIABETES MELLITUS, TYPE 2): Chronic | Status: ACTIVE | Noted: 2020-12-19

## 2023-08-22 PROBLEM — E78.00 PURE HYPERCHOLESTEROLEMIA: Status: ACTIVE | Noted: 2023-08-22
